# Patient Record
Sex: MALE | Race: BLACK OR AFRICAN AMERICAN | NOT HISPANIC OR LATINO
[De-identification: names, ages, dates, MRNs, and addresses within clinical notes are randomized per-mention and may not be internally consistent; named-entity substitution may affect disease eponyms.]

---

## 2022-12-01 PROBLEM — Z00.00 ENCOUNTER FOR PREVENTIVE HEALTH EXAMINATION: Status: ACTIVE | Noted: 2022-12-01

## 2022-12-06 ENCOUNTER — APPOINTMENT (OUTPATIENT)
Dept: ORTHOPEDIC SURGERY | Facility: CLINIC | Age: 57
End: 2022-12-06
Payer: COMMERCIAL

## 2022-12-06 VITALS
HEIGHT: 69 IN | HEART RATE: 86 BPM | DIASTOLIC BLOOD PRESSURE: 96 MMHG | WEIGHT: 180 LBS | SYSTOLIC BLOOD PRESSURE: 160 MMHG | BODY MASS INDEX: 26.66 KG/M2

## 2022-12-06 PROCEDURE — 99204 OFFICE O/P NEW MOD 45 MIN: CPT | Mod: 25

## 2022-12-06 PROCEDURE — 20550 NJX 1 TENDON SHEATH/LIGAMENT: CPT | Mod: LT

## 2022-12-12 NOTE — HISTORY OF PRESENT ILLNESS
[FreeTextEntry1] : He reports triggering of the left middle finger.  This has been going for weeks. There is stiffness in his hands which is worse in the morning. No other fingers are triggering currently. He denies any locking of the digits. He has had 0 injections in the past for this problem. Medications have not been helpful.  He describes the pain over the A1 pulley. Occasionally it radiates to the dorsal proximal interphalangeal joint. He does not report a history of diabetes.\par

## 2022-12-12 NOTE — PHYSICAL EXAM
[de-identified] : Patient is alert, oriented and in no acute distress. Affect and general appearance are normal and patient is able to answer questions appropriately.  A focused exam of the bilateral upper extremities reveals full motion of the elbow, wrist and fingers. Patient is able to make a tight fist today.  strength is excellent. He has a tender left middle finger A1 pulley with active triggering today. No other fingers have active triggering or A1 pulley tenderness.   Neurologic: Median, ulnar, and radial motor and sensory are intact.  Skin: No cyanosis, clubbing, edema or rashes. Vascular: Radial pulses intact. Lymphatic: No streaking or epitrochlear adenopathy\par

## 2022-12-12 NOTE — ASSESSMENT
[FreeTextEntry1] : 57 year-old male s/p CSI for L middle trigger finger\par \par Plan:\par F/u in  2weeks for interval assessment and potential second injection\par Nighttime extension splinting\par NSAIDs prn

## 2022-12-12 NOTE — PROCEDURE
[] : Trigger Finger: After full discussion of treatment alternatives, and associated risks, complication, limitations, and expectations, and with patient verbal consent following verbal timeout site verification, steroid injections were administered. Following sterile prep with sterile method, 0.5cc Bupivicaine and 6 mg celestone generic were injected into the flexor tendon sheaths of the left [3rd] : 3rd finger

## 2022-12-20 ENCOUNTER — APPOINTMENT (OUTPATIENT)
Dept: ORTHOPEDIC SURGERY | Facility: CLINIC | Age: 57
End: 2022-12-20

## 2022-12-20 VITALS — WEIGHT: 180 LBS | HEIGHT: 69 IN | BODY MASS INDEX: 26.66 KG/M2

## 2022-12-20 PROCEDURE — 99212 OFFICE O/P EST SF 10 MIN: CPT

## 2022-12-20 NOTE — PHYSICAL EXAM
[de-identified] : Patient is alert, oriented and in no acute distress. Affect and general appearance are normal and patient is able to answer questions appropriately.  A focused exam of the bilateral upper extremities reveals full motion of the elbow, wrist and fingers. Patient is able to make a tight fist today.  strength is excellent. He has a nontender left middle finger A1 pulley without active triggering today. No other fingers have active triggering or A1 pulley tenderness.   Neurologic: Median, ulnar, and radial motor and sensory are intact.  Skin: No cyanosis, clubbing, edema or rashes. Vascular: Radial pulses intact. Lymphatic: No streaking or epitrochlear adenopathy\par

## 2022-12-20 NOTE — HISTORY OF PRESENT ILLNESS
[FreeTextEntry1] : He reports resolution of triggering of the middle finger s/p injection.  He is doing very well with no restrictions.

## 2022-12-20 NOTE — ASSESSMENT
[FreeTextEntry1] : 57 year-old male s/p CSI for L middle trigger finger with resolution of symptoms\par \par Plan:\par AAT\par F/u prn (is a candidate for another injection)

## 2023-06-19 ENCOUNTER — INPATIENT (INPATIENT)
Facility: HOSPITAL | Age: 58
LOS: 1 days | Discharge: HOME | End: 2023-06-21
Attending: INTERNAL MEDICINE | Admitting: INTERNAL MEDICINE
Payer: COMMERCIAL

## 2023-06-19 VITALS
SYSTOLIC BLOOD PRESSURE: 150 MMHG | OXYGEN SATURATION: 100 % | DIASTOLIC BLOOD PRESSURE: 100 MMHG | HEART RATE: 102 BPM | RESPIRATION RATE: 18 BRPM | TEMPERATURE: 98 F

## 2023-06-19 DIAGNOSIS — E87.6 HYPOKALEMIA: ICD-10-CM

## 2023-06-19 DIAGNOSIS — Z29.9 ENCOUNTER FOR PROPHYLACTIC MEASURES, UNSPECIFIED: ICD-10-CM

## 2023-06-19 DIAGNOSIS — R20.0 ANESTHESIA OF SKIN: ICD-10-CM

## 2023-06-19 DIAGNOSIS — N18.2 CHRONIC KIDNEY DISEASE, STAGE 2 (MILD): ICD-10-CM

## 2023-06-19 DIAGNOSIS — I10 ESSENTIAL (PRIMARY) HYPERTENSION: ICD-10-CM

## 2023-06-19 DIAGNOSIS — R07.9 CHEST PAIN, UNSPECIFIED: ICD-10-CM

## 2023-06-19 LAB
ALBUMIN SERPL ELPH-MCNC: 4.7 G/DL — SIGNIFICANT CHANGE UP (ref 3.3–5)
ALP SERPL-CCNC: 125 U/L — HIGH (ref 40–120)
ALT FLD-CCNC: 30 U/L — SIGNIFICANT CHANGE UP (ref 4–41)
ANION GAP SERPL CALC-SCNC: 11 MMOL/L — SIGNIFICANT CHANGE UP (ref 7–14)
APTT BLD: 31 SEC — SIGNIFICANT CHANGE UP (ref 27–36.3)
AST SERPL-CCNC: 22 U/L — SIGNIFICANT CHANGE UP (ref 4–40)
BASOPHILS # BLD AUTO: 0.06 K/UL — SIGNIFICANT CHANGE UP (ref 0–0.2)
BASOPHILS NFR BLD AUTO: 0.7 % — SIGNIFICANT CHANGE UP (ref 0–2)
BILIRUB SERPL-MCNC: <0.2 MG/DL — SIGNIFICANT CHANGE UP (ref 0.2–1.2)
BUN SERPL-MCNC: 17 MG/DL — SIGNIFICANT CHANGE UP (ref 7–23)
CALCIUM SERPL-MCNC: 9.8 MG/DL — SIGNIFICANT CHANGE UP (ref 8.4–10.5)
CHLORIDE SERPL-SCNC: 105 MMOL/L — SIGNIFICANT CHANGE UP (ref 98–107)
CO2 SERPL-SCNC: 24 MMOL/L — SIGNIFICANT CHANGE UP (ref 22–31)
CREAT SERPL-MCNC: 1.35 MG/DL — HIGH (ref 0.5–1.3)
EGFR: 61 ML/MIN/1.73M2 — SIGNIFICANT CHANGE UP
EOSINOPHIL # BLD AUTO: 0.08 K/UL — SIGNIFICANT CHANGE UP (ref 0–0.5)
EOSINOPHIL NFR BLD AUTO: 0.9 % — SIGNIFICANT CHANGE UP (ref 0–6)
GLUCOSE SERPL-MCNC: 129 MG/DL — HIGH (ref 70–99)
HCT VFR BLD CALC: 43.8 % — SIGNIFICANT CHANGE UP (ref 39–50)
HGB BLD-MCNC: 14.5 G/DL — SIGNIFICANT CHANGE UP (ref 13–17)
IANC: 4.4 K/UL — SIGNIFICANT CHANGE UP (ref 1.8–7.4)
IMM GRANULOCYTES NFR BLD AUTO: 0.2 % — SIGNIFICANT CHANGE UP (ref 0–0.9)
INR BLD: 1.08 RATIO — SIGNIFICANT CHANGE UP (ref 0.88–1.16)
LYMPHOCYTES # BLD AUTO: 3.35 K/UL — HIGH (ref 1–3.3)
LYMPHOCYTES # BLD AUTO: 38.2 % — SIGNIFICANT CHANGE UP (ref 13–44)
MCHC RBC-ENTMCNC: 30.3 PG — SIGNIFICANT CHANGE UP (ref 27–34)
MCHC RBC-ENTMCNC: 33.1 GM/DL — SIGNIFICANT CHANGE UP (ref 32–36)
MCV RBC AUTO: 91.4 FL — SIGNIFICANT CHANGE UP (ref 80–100)
MONOCYTES # BLD AUTO: 0.85 K/UL — SIGNIFICANT CHANGE UP (ref 0–0.9)
MONOCYTES NFR BLD AUTO: 9.7 % — SIGNIFICANT CHANGE UP (ref 2–14)
NEUTROPHILS # BLD AUTO: 4.4 K/UL — SIGNIFICANT CHANGE UP (ref 1.8–7.4)
NEUTROPHILS NFR BLD AUTO: 50.3 % — SIGNIFICANT CHANGE UP (ref 43–77)
NRBC # BLD: 0 /100 WBCS — SIGNIFICANT CHANGE UP (ref 0–0)
NRBC # FLD: 0 K/UL — SIGNIFICANT CHANGE UP (ref 0–0)
PLATELET # BLD AUTO: 231 K/UL — SIGNIFICANT CHANGE UP (ref 150–400)
POTASSIUM SERPL-MCNC: 3.3 MMOL/L — LOW (ref 3.5–5.3)
POTASSIUM SERPL-SCNC: 3.3 MMOL/L — LOW (ref 3.5–5.3)
PROT SERPL-MCNC: 8.2 G/DL — SIGNIFICANT CHANGE UP (ref 6–8.3)
PROTHROM AB SERPL-ACNC: 12.5 SEC — SIGNIFICANT CHANGE UP (ref 10.5–13.4)
RBC # BLD: 4.79 M/UL — SIGNIFICANT CHANGE UP (ref 4.2–5.8)
RBC # FLD: 12.7 % — SIGNIFICANT CHANGE UP (ref 10.3–14.5)
SODIUM SERPL-SCNC: 140 MMOL/L — SIGNIFICANT CHANGE UP (ref 135–145)
TROPONIN T, HIGH SENSITIVITY RESULT: 10 NG/L — SIGNIFICANT CHANGE UP
TROPONIN T, HIGH SENSITIVITY RESULT: 11 NG/L — SIGNIFICANT CHANGE UP
WBC # BLD: 8.76 K/UL — SIGNIFICANT CHANGE UP (ref 3.8–10.5)
WBC # FLD AUTO: 8.76 K/UL — SIGNIFICANT CHANGE UP (ref 3.8–10.5)

## 2023-06-19 PROCEDURE — 70496 CT ANGIOGRAPHY HEAD: CPT | Mod: 26,MA

## 2023-06-19 PROCEDURE — 99223 1ST HOSP IP/OBS HIGH 75: CPT

## 2023-06-19 PROCEDURE — 99254 IP/OBS CNSLTJ NEW/EST MOD 60: CPT

## 2023-06-19 PROCEDURE — 70498 CT ANGIOGRAPHY NECK: CPT | Mod: 26,MA

## 2023-06-19 PROCEDURE — 0042T: CPT | Mod: MA

## 2023-06-19 PROCEDURE — 99291 CRITICAL CARE FIRST HOUR: CPT

## 2023-06-19 PROCEDURE — 93306 TTE W/DOPPLER COMPLETE: CPT | Mod: 26

## 2023-06-19 RX ORDER — ATORVASTATIN CALCIUM 80 MG/1
80 TABLET, FILM COATED ORAL AT BEDTIME
Refills: 0 | Status: DISCONTINUED | OUTPATIENT
Start: 2023-06-19 | End: 2023-06-21

## 2023-06-19 RX ORDER — AMLODIPINE BESYLATE 2.5 MG/1
1 TABLET ORAL
Refills: 0 | DISCHARGE

## 2023-06-19 RX ORDER — PREGABALIN 225 MG/1
1000 CAPSULE ORAL DAILY
Refills: 0 | Status: DISCONTINUED | OUTPATIENT
Start: 2023-06-19 | End: 2023-06-21

## 2023-06-19 RX ORDER — ASPIRIN/CALCIUM CARB/MAGNESIUM 324 MG
81 TABLET ORAL DAILY
Refills: 0 | Status: DISCONTINUED | OUTPATIENT
Start: 2023-06-19 | End: 2023-06-21

## 2023-06-19 RX ORDER — CHOLECALCIFEROL (VITAMIN D3) 125 MCG
1000 CAPSULE ORAL DAILY
Refills: 0 | Status: DISCONTINUED | OUTPATIENT
Start: 2023-06-19 | End: 2023-06-21

## 2023-06-19 NOTE — SWALLOW BEDSIDE ASSESSMENT ADULT - ASR SWALLOW RECOMMEND DIAG
Objective testing is NOT indicated given functional swallow for regular solids and thin liquids with no overt s/s of penetration/aspiration evidenced

## 2023-06-19 NOTE — ED ADULT NURSE REASSESSMENT NOTE - NS ED NURSE REASSESS COMMENT FT1
Pt was received from handoff. Pt is AxO 3. Pt continues to have some left sided numbness in his left foot, and face. BEFAST negative, with equal sensation bilaterally. Respirations are even and unlabored. Pt denies headaches, dizziness, N/V/D, chest pain, SOB, or fever like symptoms. Pt is awaiting for MRI. Will continue to monitor,  and maintain safety.

## 2023-06-19 NOTE — PHYSICAL THERAPY INITIAL EVALUATION ADULT - PERTINENT HX OF CURRENT PROBLEM, REHAB EVAL
57-year-old male with HTN, presenting from home with 5 days of intermittent chest pain and 1 night of Left facial numbness and Left foot paresthesias, admitted for further work up

## 2023-06-19 NOTE — H&P ADULT - NSHPPHYSICALEXAM_GEN_ALL_CORE
Vital Signs Last 24 Hrs  T(C): 36.7 (19 Jun 2023 01:56), Max: 36.7 (19 Jun 2023 01:56)  T(F): 98.1 (19 Jun 2023 01:56), Max: 98.1 (19 Jun 2023 01:56)  HR: 102 (19 Jun 2023 01:56) (102 - 102)  BP: 150/100 (19 Jun 2023 01:56) (150/100 - 150/100)  RR: 18 (19 Jun 2023 01:56) (18 - 18)  SpO2: 100% (19 Jun 2023 01:56) (100% - 100%)    Parameters below as of 19 Jun 2023 01:56  Patient On (Oxygen Delivery Method): room air    PHYSICAL EXAM:  GENERAL: NAD, well-developed, well-nourished  HEAD:  Atraumatic, Normocephalic  EYES: EOMI, PERRL, conjunctiva and sclera clear  NECK: Supple, No JVD  CHEST/LUNG: Clear to auscultation bilaterally; No wheezes, rales or rhonchi; normal work of breathing, speaking in full sentences  HEART: Regular rate and rhythm; No murmurs, rubs, or gallops, (+)S1, S2  ABDOMEN: Soft, Nontender, Nondistended; Normal Bowel sounds   EXTREMITIES:  2+ Peripheral Pulses, No clubbing, cyanosis, or edema  PSYCH: normal mood and affect, A&Ox3  NEUROLOGY: no focal neuro deficits, slight tremor L hand (chronic per patient), CN II-XII intact, FTN intact b/l, strength 5/5 x4 extremities, sensation grossly intact  SKIN: No rashes or lesions on limited exam

## 2023-06-19 NOTE — ED PROVIDER NOTE - PHYSICAL EXAMINATION
Well-appearing no acute distress speaking full sentences.  Right facial droop with minimal labial fold activation compared to left.  Heart is regular rate and rhythm lungs are clear to auscultation abdomen soft nontender finger-to-nose with some tremors on left right is normal.   strength is 5 out of 5 bilateral upper extremities.  Sensation intact to light touch on bilateral face and lower legs and upper extremities.  No pitting edema.  Gait is normal

## 2023-06-19 NOTE — CONSULT NOTE ADULT - ASSESSMENT
57-year-old male with HTN, presenting from home with 5 days of intermittent chest pain and 1 night of L facial numbness and L foot paresthesias, admitted for further w/u. I have reviewed and confirmed nurses' notes...

## 2023-06-19 NOTE — H&P ADULT - PROBLEM SELECTOR PLAN 1
improving   appreciate neuro recs  check MRI brain w/o   TTE with bubble study   start on Aspirin 81MG PO daily pending dysphagia screen, discontinue if MRI negative   start Atorvastatin 80MG QHS, titrate to LDL<70 pending dysphagia screen  monitor on tele  neuro checks and VS Q4H  Permissive HTN up to 220/120 mmHg for first 24 hours after symptom onset followed by gradual normotension.   NPO pending dysphagia screen  Fall, aspiration precautions  PT/OT if MRI (+) for CVA improving   appreciate neuro recs  check MRI brain w/o   TTE with bubble study   start on Aspirin 81MG PO daily pending dysphagia screen, discontinue if MRI negative   start Atorvastatin 80MG QHS, titrate to LDL<70 pending dysphagia screen  monitor on tele  neuro checks and VS Q4H  Permissive HTN up to 220/120 mmHg for first 24 hours after symptom onset followed by gradual normotension.   NPO pending dysphagia screen  Fall, aspiration precautions  PT/OT if MRI (+) for CVA  recent B12 204, will add on repeat to re-check and start on B12 supplementation

## 2023-06-19 NOTE — CONSULT NOTE ADULT - SUBJECTIVE AND OBJECTIVE BOX
CHIEF COMPLAINT:    HISTORY OF PRESENT ILLNESS:    PAST MEDICAL & SURGICAL HISTORY:  HTN (hypertension)      Gastritis      No significant past surgical history              MEDICATIONS:                  FAMILY HISTORY:  No pertinent family history in first degree relatives        SOCIAL HISTORY:    [ ] Non-smoker  [ ] Smoker  [ ] Alcohol    Allergies    No Known Allergies    Intolerances    	    REVIEW OF SYSTEMS:  CONSTITUTIONAL: No fever, weight loss, or fatigue  EYES: No eye pain, visual disturbances, or discharge  ENMT:  No difficulty hearing, tinnitus, vertigo; No sinus or throat pain  NECK: No pain or stiffness  RESPIRATORY: No cough, wheezing, chills or hemoptysis; No Shortness of Breath  CARDIOVASCULAR: No chest pain, palpitations, passing out, dizziness, or leg swelling  GASTROINTESTINAL: No abdominal or epigastric pain. No nausea, vomiting, or hematemesis; No diarrhea or constipation. No melena or hematochezia.  GENITOURINARY: No dysuria, frequency, hematuria, or incontinence  NEUROLOGICAL: No headaches, memory loss, loss of strength, numbness, or tremors  SKIN: No itching, burning, rashes, or lesions   LYMPH Nodes: No enlarged glands  ENDOCRINE: No heat or cold intolerance; No hair loss  MUSCULOSKELETAL: No joint pain or swelling; No muscle, back, or extremity pain  PSYCHIATRIC: No depression, anxiety, mood swings, or difficulty sleeping  HEME/LYMPH: No easy bruising, or bleeding gums  ALLERY AND IMMUNOLOGIC: No hives or eczema	    [ ] All others negative	  [ ] Unable to obtain    PHYSICAL EXAM:  T(C): 36.4 (06-19-23 @ 08:52), Max: 36.8 (06-19-23 @ 06:20)  HR: 84 (06-19-23 @ 08:52) (84 - 102)  BP: 149/92 (06-19-23 @ 08:52) (145/89 - 150/100)  RR: 18 (06-19-23 @ 08:52) (18 - 18)  SpO2: 100% (06-19-23 @ 08:52) (100% - 100%)  Wt(kg): --  I&O's Summary      Appearance: Normal	  HEENT:   Normal oral mucosa, PERRL, EOMI	  Lymphatic: No lymphadenopathy  Cardiovascular: Normal S1 S2, No JVD, No murmurs, No edema  Respiratory: Lungs clear to auscultation	  Psychiatry: A & O x 3, Mood & affect appropriate  Gastrointestinal:  Soft, Non-tender, + BS	  Skin: No rashes, No ecchymoses, No cyanosis	  Neurologic: Non-focal  Extremities: Normal range of motion, No clubbing, cyanosis or edema  Vascular: Peripheral pulses palpable 2+ bilaterally    TELEMETRY: 	    ECG:  	  RADIOLOGY:  OTHER: 	  	  LABS:	 	    CARDIAC MARKERS:      Troponin T, High Sensitivity Result: 11: Rapid changes upward or downward in high-sensitivity troponin levels Troponin T, High Sensitivity Result: 10: Rapid changes upward or downward in high-sensitivity troponin levels                             14.5   8.76  )-----------( 231      ( 19 Jun 2023 02:50 )             43.8     06-19    140  |  105  |  17  ----------------------------<  129<H>  3.3<L>   |  24  |  1.35<H>    Ca    9.8      19 Jun 2023 02:50    TPro  8.2  /  Alb  4.7  /  TBili  <0.2  /  DBili  x   /  AST  22  /  ALT  30  /  AlkPhos  125<H>  06-19    proBNP:   Lipid Profile:   HgA1c:   TSH:            CHIEF COMPLAINT:    HISTORY OF PRESENT ILLNESS:  57-year-old male with HTN, presenting from home with 5 days of intermittent chest pain described as a pressure, non-radiating, without associated shortness of breath/diaphoresis/nausea/vomiting, worse when laying supine, non-pleuritic in nature. Patient reports having a stress test and echo last week due to elevated BP, does not yet know results. He notes that the chest pain started after the stress testing. He notes that last night experienced acute onset of L facial numbness and L foot paresthesias. He denies any facial drooping, slurring of speech, weakness. He notes the paresthesias have resolved and the facial numbness has improved. He has no known history of heart disease or stroke. He reports intermittent non-adherence with medication regimen however has been taking it as prescribed for the past 5 days.    In the ED VS:  98.1  102  150/100  18  100%RA      PAST MEDICAL & SURGICAL HISTORY:  HTN (hypertension)      Gastritis      No significant past surgical history              MEDICATIONS:                  FAMILY HISTORY:  No pertinent family history in first degree relatives        SOCIAL HISTORY:    [ ] Non-smoker  [ ] Smoker  [ ] Alcohol    Allergies    No Known Allergies    Intolerances    	    REVIEW OF SYSTEMS:  CONSTITUTIONAL: No fever, weight loss, or fatigue  EYES: No eye pain, visual disturbances, or discharge  ENMT:  No difficulty hearing, tinnitus, vertigo; No sinus or throat pain  NECK: No pain or stiffness  RESPIRATORY: No cough, wheezing, chills or hemoptysis; No Shortness of Breath  CARDIOVASCULAR: No chest pain, palpitations, passing out, dizziness, or leg swelling  GASTROINTESTINAL: No abdominal or epigastric pain. No nausea, vomiting, or hematemesis; No diarrhea or constipation. No melena or hematochezia.  GENITOURINARY: No dysuria, frequency, hematuria, or incontinence  NEUROLOGICAL: No headaches, memory loss, loss of strength, numbness, or tremors  SKIN: No itching, burning, rashes, or lesions   LYMPH Nodes: No enlarged glands  ENDOCRINE: No heat or cold intolerance; No hair loss  MUSCULOSKELETAL: No joint pain or swelling; No muscle, back, or extremity pain  PSYCHIATRIC: No depression, anxiety, mood swings, or difficulty sleeping  HEME/LYMPH: No easy bruising, or bleeding gums  ALLERY AND IMMUNOLOGIC: No hives or eczema	    [ ] All others negative	  [ ] Unable to obtain    PHYSICAL EXAM:  T(C): 36.4 (06-19-23 @ 08:52), Max: 36.8 (06-19-23 @ 06:20)  HR: 84 (06-19-23 @ 08:52) (84 - 102)  BP: 149/92 (06-19-23 @ 08:52) (145/89 - 150/100)  RR: 18 (06-19-23 @ 08:52) (18 - 18)  SpO2: 100% (06-19-23 @ 08:52) (100% - 100%)  Wt(kg): --  I&O's Summary      Appearance: Normal	  HEENT:   Normal oral mucosa, PERRL, EOMI	  Lymphatic: No lymphadenopathy  Cardiovascular: Normal S1 S2, No JVD, No murmurs, No edema  Respiratory: Lungs clear to auscultation	  Psychiatry: A & O x 3, Mood & affect appropriate  Gastrointestinal:  Soft, Non-tender, + BS	  Skin: No rashes, No ecchymoses, No cyanosis	  Neurologic: Non-focal  Extremities: Normal range of motion, No clubbing, cyanosis or edema  Vascular: Peripheral pulses palpable 2+ bilaterally    TELEMETRY: 	    ECG:  	  RADIOLOGY:  < from: CT Brain Perfusion Maps Stroke (06.19.23 @ 03:17) >    ACC: [id number] EXAM:  CT ANGIO NECK STROKE PROTCL IC   ORDERED BY: MARVIN GUZMAN     ACC: [id number] EXAM:  CT BRAIN PERFUSION MAPS STROKE   ORDERED BY: MARVIN GUZMAN     ACC: [id number] EXAM:  CT ANGIO BRAIN STROKE PROTC IC   ORDERED BY: MARVIN GUZMAN     PROCEDURE DATE:  06/19/2023          INTERPRETATION:  CLINICAL HISTORY: Code stroke. Left facial numbness.    TECHNIQUE:  During IV contrast administration, serial thin section CT images of the   brain were obtained for CT perfusion. The raw data was sent to [facility]   [facility] for post processing.  Axial CT images were then acquired through the  neck and head  during the   arterial phase.  Three-dimensional MIP reformats were generated.    CONTRAST/COMPLICATIONS:  IV Contrast: IV contrast documented in unlinked concurrent exam   (accession [id number]), Omnipaque 350 (accession [id number]), IV contrast   documented in unlinked concurrent exam (accession [id number])  95 cc   administered   5 cc discarded  Complications: None reported at time of study completion    COMPARISON STUDY: None.    FINDINGS:    CT PERFUSION:    Perfusion parameters are as follows:    CBF <30%: 0 mL  Tmax >6 seconds: 0 mL  Mismatch volume: 0 mL  Mismatch ratio: None.    CT ANGIOGRAPHY NECK:    Thoracic aorta and branch vessels: Patent.  No atherosclerosis.  No   flow-limiting stenosis.  No evidence of dissection.    Right carotid system: Patent.  No atherosclerosis.  No hemodynamically   significant stenosis using NASCET criteria.  No evidence of dissection.    Left carotid system: Patent.  No atherosclerosis.  No hemodynamically   significant stenosis using NASCET criteria.  No evidence of dissection.    Vertebral arteries: Patent.  No atherosclerosis.  No flow-limiting   stenosis.  No evidence of dissection.    Soft tissues of the neck: Unremarkable.    Visualized spine: Multilevel degenerative change.    Visualized upper chest: Unremarkable.    CT ANGIOGRAPHY BRAIN:    Internal carotid arteries: Patent bilaterally. No discrete calcifications   No flow limiting stenosis.    Anterior cerebral arteries: Patent bilaterally without flow limiting   stenosis.    Middle cerebral arteries: Patent bilaterally without flow limiting   stenosis.    Anterior communicating artery: Visualized.    Posterior communicating arteries: Visualized bilaterally.    Posterior cerebral arteries: Patent bilaterally without stenosis.    Vertebrobasilar: Patent without stenosis. The distal vertebral arteries   are similar in caliber.  Bilateral posterior inferior cerebellar   arteries, bilateral anterior inferior cerebellar arteries and bilateral   superior cerebellar arteries are visualized.    Vascular lesions: No evidence of intracranial aneurysm within limits of   CTA technique.  Tiny aneurysms may be beyond the resolution of this   examination.    Dural venous sinuses: Grossly patent.    IMPRESSION:    CTA Neck: No significant flow-limiting stenosis or evidence of acute   dissection within the cervical carotid or vertebral arteries.    CTA Head: No proximal large vessel occlusion or significant stenosis.    CT Perfusion: No perfusion abnormality.    --- End of Report ---            FRANKY GALLARDO MD; Attending Radiologist  This document has been electronically signed. Jun 19 2023  3:21AM    < end of copied text >  Troponin T, High Sensitivity Result: 11: Rapid changes upward or downward in high-sensitivity troponin levels Troponin T, High Sensitivity Result: 10: Rapid changes upward or downward in high-sensitivity troponin levels   OTHER: 	  	  LABS:	 	    CARDIAC MARKERS:      Troponin T, High Sensitivity Result: 11: Rapid changes upward or downward in high-sensitivity troponin levels Troponin T, High Sensitivity Result: 10: Rapid changes upward or downward in high-sensitivity troponin levels                             14.5   8.76  )-----------( 231      ( 19 Jun 2023 02:50 )             43.8     06-19    140  |  105  |  17  ----------------------------<  129<H>  3.3<L>   |  24  |  1.35<H>    Ca    9.8      19 Jun 2023 02:50    TPro  8.2  /  Alb  4.7  /  TBili  <0.2  /  DBili  x   /  AST  22  /  ALT  30  /  AlkPhos  125<H>  06-19    proBNP:   Lipid Profile:   HgA1c:   TSH:

## 2023-06-19 NOTE — CONSULT NOTE ADULT - SUBJECTIVE AND OBJECTIVE BOX
Neurology - Consult Note    -  Spectra: [phone] ([facility]), [phone] ([facility])  -    HPI: Patient DAVE LEYVA is a 57y (1965) man with HTN presenting as code stroke for left facial numbness for 3 days. Patient also endorses left foot tingling sensation as well. Denies weakness, changes to speech    NIHSS 0  mRS 0  LKN 6/16 unknown time      Review of Systems:   All other review of systems is negative unless indicated above.    Allergies:  No Known Allergies      PMHx/PSHx/Family Hx: As above, otherwise see below   HTN (hypertension)        Social Hx:  No current use of tobacco, alcohol, or illicit drugs      Medications:  MEDICATIONS  (STANDING):    MEDICATIONS  (PRN):      Vitals:  T(C): 36.7 (06-19-23 @ 01:56), Max: 36.7 (06-19-23 @ 01:56)  HR: 102 (06-19-23 @ 01:56) (102 - 102)  BP: 150/100 (06-19-23 @ 01:56) (150/100 - 150/100)  RR: 18 (06-19-23 @ 01:56) (18 - 18)  SpO2: 100% (06-19-23 @ 01:56) (100% - 100%)    Physical Examination: INCOMPLETE  General - NAD    Neurologic Exam:  Mental status - Awake, Alert, Oriented to person, place, and time. Speech fluent, repetition and naming intact. Follows simple and complex commands    Cranial nerves - PERRL, VFF, EOMI, face sensation (V1-V3) intact b/l, facial strength intact without asymmetry b/l, hearing intact b/l, palate with symmetric elevation, trapezius 5/5 strength b/l, tongue midline on protrusion with full lateral movement    Motor - Normal bulk and tone throughout. No pronator drift.  Strength testing            Deltoid      Biceps      Triceps     Wrist Extension    Wrist Flexion                               R            5                 5               5                     5                              5                        5                   L             5                 5               5                     5                              5                        5                              Hip Flexion    Hip Extension    Knee Flexion    Knee Extension    Dorsiflexion    Plantar Flexion  R              5                           5                       5                           5                            5                          5  L              5                           5                        5                           5                            5                          5    Sensation - Light touch/temperature/pain intact throughout    DTR's -             Biceps      Triceps     Brachioradialis      Patellar    Ankle    Toes/plantar response  R             2+             2+                  2+                       2+            2+                 Down  L              2+             2+                 2+                        2+           2+                 Down    Coordination - Finger to Nose intact b/l. No tremors appreciated    Gait and station - Unable to assess     Labs:          CAPILLARY BLOOD GLUCOSE  119 (19 Jun 2023 02:58)      POCT Blood Glucose.: 119 mg/dL (19 Jun 2023 02:47)        Radiology:     Neurology - Consult Note    -  Spectra: [phone] ([facility]), [phone] ([facility])  -    HPI: Patient DAVE LEYVA is a RH 57y (1965) man with HTN presenting as code stroke for left facial numbness for 3 days. He started to take aspirin when the numbness started. Patient also endorses left foot tingling sensation as well that started at 11 pm 6/18. He endorses previous episode of left foot tingling, however never experienced facial numbness before. He also endorses chest pain. Denies weakness, changes to speech, difficulty walking, dizziness, headache, changes to vision.     NIHSS 0  mRS 0  LKN 6/16 unknown time      Review of Systems:   All other review of systems is negative unless indicated above.    Allergies:  No Known Allergies      PMHx/PSHx/Family Hx: As above, otherwise see below   HTN (hypertension)        Social Hx:  No current use of tobacco, alcohol, or illicit drugs      Medications:  MEDICATIONS  (STANDING):    MEDICATIONS  (PRN):      Vitals:  T(C): 36.7 (06-19-23 @ 01:56), Max: 36.7 (06-19-23 @ 01:56)  HR: 102 (06-19-23 @ 01:56) (102 - 102)  BP: 150/100 (06-19-23 @ 01:56) (150/100 - 150/100)  RR: 18 (06-19-23 @ 01:56) (18 - 18)  SpO2: 100% (06-19-23 @ 01:56) (100% - 100%)    Physical Examination:  General - NAD    Neurologic Exam:  Mental status - Awake, Alert, Oriented to person, place, and time. Speech fluent, repetition and naming intact. Follows simple and complex commands    Cranial nerves - PERRL, VFF, EOMI, face sensation (V1-V3) intact b/l, facial strength intact without asymmetry b/l, hearing intact b/l, palate with symmetric elevation, trapezius 5/5 strength b/l, tongue midline on protrusion with full lateral movement    Motor - Normal bulk and tone throughout. No pronator drift.  Strength testing            Deltoid      Biceps      Triceps     Wrist Extension    Wrist Flexion                               R            5                 5               5                     5                              5                        5                   L             5                 5               5                     5                              5                        5                              Hip Flexion    Hip Extension    Knee Flexion    Knee Extension    Dorsiflexion    Plantar Flexion  R              5                           5                       5                           5                            5                          5  L              5                           5                        5                           5                            5                          5    Sensation - Light touch/temperature/pain intact throughout    DTR's -             Biceps      Triceps     Brachioradialis      Patellar    Ankle    Toes/plantar response  R             2+             2+                  2+                       2+            2+                 Down  L              2+             2+                 2+                        2+           2+                 Down    Coordination - Finger to Nose intact b/l. No tremors appreciated    Gait and station - Unable to assess     Labs:          CAPILLARY BLOOD GLUCOSE  119 (19 Jun 2023 02:58)      POCT Blood Glucose.: 119 mg/dL (19 Jun 2023 02:47)        Radiology:  < from: CT Brain Perfusion Maps Stroke (06.19.23 @ 03:17) >  IMPRESSION:    CTA Neck: No significant flow-limiting stenosis or evidence of acute   dissection within the cervical carotid or vertebral arteries.    CTA Head: No proximal large vessel occlusion or significant stenosis.    CT Perfusion: No perfusion abnormality.    < from: CT Brain Stroke Protocol (06.19.23 @ 03:14) >  IMPRESSION:    No CT evidence of acute intracranial hemorrhage, mass effect, or evidence   of acute vascular territorial infarction.          Neurology - Consult Note    -  Spectra: [phone] ([facility]), [phone] ([facility])  -    HPI: Patient DAVE LEYVA is a RH 57y (1965) man with HTN presenting as code stroke for left facial numbness for 3 days. He started to take aspirin when the numbness started. Numbness fluctuates in intensity over the last 3 days. Patient also endorses left foot tingling sensation as well that started at 11 pm 6/18. He endorses previous episode of left foot tingling, however never experienced facial numbness before. He also endorses chest pain. Denies weakness, changes to speech, difficulty walking, dizziness, headache, changes to vision.     NIHSS 0  mRS 0  LKN 6/16 unknown time      Review of Systems:   All other review of systems is negative unless indicated above.    Allergies:  No Known Allergies      PMHx/PSHx/Family Hx: As above, otherwise see below   HTN (hypertension)        Social Hx:  No current use of tobacco, alcohol, or illicit drugs      Medications:  MEDICATIONS  (STANDING):    MEDICATIONS  (PRN):      Vitals:  T(C): 36.7 (06-19-23 @ 01:56), Max: 36.7 (06-19-23 @ 01:56)  HR: 102 (06-19-23 @ 01:56) (102 - 102)  BP: 150/100 (06-19-23 @ 01:56) (150/100 - 150/100)  RR: 18 (06-19-23 @ 01:56) (18 - 18)  SpO2: 100% (06-19-23 @ 01:56) (100% - 100%)    Physical Examination:  General - NAD    Neurologic Exam:  Mental status - Awake, Alert, Oriented to person, place, and time. Speech fluent, repetition and naming intact. Follows simple and complex commands    Cranial nerves - PERRL, VFF, EOMI, face sensation (V1-V3) intact b/l, facial strength intact without asymmetry b/l, hearing intact b/l, palate with symmetric elevation, trapezius 5/5 strength b/l, tongue midline on protrusion with full lateral movement    Motor - Normal bulk and tone throughout. No pronator drift.  Strength testing            Deltoid      Biceps      Triceps     Wrist Extension    Wrist Flexion                               R            5                 5               5                     5                              5                        5                   L             5                 5               5                     5                              5                        5                              Hip Flexion    Hip Extension    Knee Flexion    Knee Extension    Dorsiflexion    Plantar Flexion  R              5                           5                       5                           5                            5                          5  L              5                           5                        5                           5                            5                          5    Sensation - Light touch/temperature/pain intact throughout    DTR's -             Biceps      Triceps     Brachioradialis      Patellar    Ankle    Toes/plantar response  R             2+             2+                  2+                       2+            2+                 Down  L              2+             2+                 2+                        2+           2+                 Down    Coordination - Finger to Nose intact b/l. No tremors appreciated    Gait and station - Unable to assess     Labs:          CAPILLARY BLOOD GLUCOSE  119 (19 Jun 2023 02:58)      POCT Blood Glucose.: 119 mg/dL (19 Jun 2023 02:47)        Radiology:  < from: CT Brain Perfusion Maps Stroke (06.19.23 @ 03:17) >  IMPRESSION:    CTA Neck: No significant flow-limiting stenosis or evidence of acute   dissection within the cervical carotid or vertebral arteries.    CTA Head: No proximal large vessel occlusion or significant stenosis.    CT Perfusion: No perfusion abnormality.    < from: CT Brain Stroke Protocol (06.19.23 @ 03:14) >  IMPRESSION:    No CT evidence of acute intracranial hemorrhage, mass effect, or evidence   of acute vascular territorial infarction.

## 2023-06-19 NOTE — ED ADULT NURSE REASSESSMENT NOTE - NS ED NURSE REASSESS COMMENT FT1
Pt remains resting in bed. Pt denies headaches, dizziness, N/V/D, chest pain, or SOB. Respirations are even and unlabored. Will; continue to monitor.

## 2023-06-19 NOTE — H&P ADULT - PROBLEM SELECTOR PLAN 3
not experiencing at present  would call PMD as patient unsure of cardiologist, who recently performed stress test and obtain results   flat delta trop, check 3rd set  monitor on tele

## 2023-06-19 NOTE — SWALLOW BEDSIDE ASSESSMENT ADULT - COMMENTS
As per H&P dated 6/19/23, "57-year-old male with HTN, presenting from home with 5 days of intermittent chest pain and 1 night of L facial numbness and L foot paresthesias, admitted for further w/u."    As per Neurology note dated 6/19/23 "Impression: Left facial numbness possibly due toxic, metabolic infectious etiology vs acute ischemic stroke"    No available chest imaging.     Patient visited at bedside for clinical swallow evaluation. Patient presents as awake and alert, able to follow 1-step directions and make basic wants/needs known. Patient denies difficulty swallowing.

## 2023-06-19 NOTE — PHYSICAL THERAPY INITIAL EVALUATION ADULT - GROSSLY INTACT, SENSORY
Medication Name: Adderall XR    No prior authorization is needed, Medication Prior Authorization team will close this encounter    Prescription should of been for IR and it is approved   Grossly Intact

## 2023-06-19 NOTE — H&P ADULT - NSHPSOCIALHISTORY_GEN_ALL_CORE
Lives with ant and 2 children  Rare alcohol use  Denies tobacco or illicit drug use  Not presently working, previously worked as an

## 2023-06-19 NOTE — H&P ADULT - NSHPREVIEWOFSYSTEMS_GEN_ALL_CORE
REVIEW OF SYSTEMS:    CONSTITUTIONAL: No weakness, fevers or chills  EYES/ENT: No visual changes; No dysphagia; No sore throat; No rhinorrhea; No sinus pain/pressure  NECK: No pain or stiffness  RESPIRATORY: No cough, wheezing, hemoptysis; No shortness of breath  CARDIOVASCULAR: (+) intermittent chest pain; No palpitations; No lower extremity edema  GASTROINTESTINAL: No abdominal or epigastric pain. No nausea, vomiting, or hematemesis; No diarrhea or constipation. No melena or hematochezia.  GENITOURINARY: No dysuria, frequency or hematuria  NEUROLOGICAL: (+) numbness, paresthesias; No weakness; No HA; No LH/dizziness  MSK: ambulates without aid; No falls  SKIN: No itching, burning, rashes, or lesions   All other review of systems is negative unless indicated above. REVIEW OF SYSTEMS:    CONSTITUTIONAL: No weakness, fevers or chills  EYES/ENT: No visual changes; No dysphagia; No sore throat; No rhinorrhea; No sinus pain/pressure  NECK: No pain or stiffness  RESPIRATORY: No cough, wheezing, hemoptysis; No shortness of breath  CARDIOVASCULAR: (+) intermittent chest pain; No palpitations; No lower extremity edema  GASTROINTESTINAL: No abdominal or epigastric pain. No nausea, vomiting, or hematemesis; No diarrhea or constipation. No melena or hematochezia. (+) intermittent GERD   GENITOURINARY: No dysuria, frequency or hematuria  NEUROLOGICAL: (+) numbness, paresthesias; No weakness; No HA; No LH/dizziness  MSK: ambulates without aid; No falls  SKIN: No itching, burning, rashes, or lesions   All other review of systems is negative unless indicated above.

## 2023-06-19 NOTE — H&P ADULT - HISTORY OF PRESENT ILLNESS
57-year-old male with HTN, presenting from home with 5 days of intermittent chest pain described as a pressure, non-radiating, without associated shortness of breath/diaphoresis/nausea/vomiting, worse when laying supine, non-pleuritic in nature. Patient reports having a stress test and echo last week due to elevated BP, does not yet know results. He notes that the chest pain started after the stress testing. He notes that last night experienced acute onset of L facial numbness and L foot paresthesias. He denies any facial drooping, slurring of speech, weakness. He notes the paresthesias have resolved and the facial numbness has improved. He has no known history of heart disease or stroke. He reports intermittent non-adherence with medication regimen however has been taking it as prescribed for the past 5 days.    In the ED VS:  98.1  102  150/100  18  100%RA

## 2023-06-19 NOTE — SWALLOW BEDSIDE ASSESSMENT ADULT - SWALLOW EVAL: DIAGNOSIS
1. Functional oral phase for puree, regular solids, mildly-thick and thin liquids marked by adequate acceptance and containment, adequate mastication of solids, adequate oral transit and adequate oral clearance. 2. Functional pharyngeal phase for aforementioned consistencies marked by hyolaryngeal excursion present upon palpation and no overt s/s of penetration/aspiration evidenced.

## 2023-06-19 NOTE — ED PROVIDER NOTE - OBJECTIVE STATEMENT
57-year-old male with history of hypertension on amlodipine that takes it "as needed" presents with multiple medical complaints including chest pain but also left facial numbness and left foot tingling.  Chest pain started 5 days ago pinching pain sharp in quality with pressure-like quality as well nonradiating started 5 days ago not worse with exertion no associated shortness of breath no associated diaphoresis or nausea or vomiting.  Also tonight started having some numbness to left side of face and then left foot felt like it was tingling.  Is still able to ambulate normally.  Was brought in by daughter for concerns of stroke.  Patient denies any recent fevers, chills, nausea, vomiting, diarrhea, urinary symptoms.  Has been otherwise well non-smoker.

## 2023-06-19 NOTE — CONSULT NOTE ADULT - ASSESSMENT
57y (1965) man with HTN presenting as code stroke for left facial numbness for 3 days. Patient also endorses left foot tingling sensation as well. Exam nonfocal.     Not Tenecteplase candidate due to out of window, no focal deficits      Impression:    Recommendation:   Imaging/Labs  [] Follow final CTH and CTA H/N results   [] MRI brain w/o contrast to look at the extent and distribution of the stroke  [] TTE with bubble study and telemetry to look for a cardiac source of embolism   [] HbA1C and Lipid Panel    Meds  [] Start Aspirin 81MG PO daily, discontinue if MRI B negative   [] Atorvastatin 80MG QHS, titrate to LDL<70    Other  [] Telemonitoring; Neurochecks and vital signs per unit protocol, Q4H  [] Permissive HTN up to 220/120 mmHg for first 24 hours after symptom onset followed by gradual normotension.   [] BG goal <180, avoid hypoglycemia  [] NPO until clears dysphagia screen, otherwise swallow evaluation  [] Fall, aspiration precautions    Case discussed with telestroke attending .   57y (1965) man with HTN presenting as code stroke for left facial numbness for 3 days. He started to take aspirin when the numbness started. Patient also endorses left foot tingling sensation as well that started at 11 pm 6/18. He endorses previous episode of left foot tingling, however never experienced facial numbness before. He also endorses chest pain. Denies weakness, changes to speech, difficulty walking, dizziness, headache, changes to vision. Exam nonfocal. CTH, CTA, CTP normal.     Not Tenecteplase candidate due to out of window, no focal deficits  Not thrombectomy candidate due to no LVO    Impression:    Recommendation:   Imaging/Labs  [] MRI brain w/o   [] TTE with bubble study   [] HbA1C and Lipid Panel    Meds  [] Aspirin 81MG PO daily, discontinue if MRI B negative   [] Atorvastatin 80MG QHS, titrate to LDL<70    Other  [] Telemonitoring; Neurochecks and vital signs per unit protocol, Q4H  [] Permissive HTN up to 220/120 mmHg for first 24 hours after symptom onset followed by gradual normotension.   [] BG goal <180, avoid hypoglycemia  [] NPO until clears dysphagia screen, otherwise swallow evaluation  [] Fall, aspiration precautions    Case discussed with telestroke attending .   57y (1965) man with HTN presenting as code stroke for left facial numbness for 3 days. He started to take aspirin when the numbness started. Patient also endorses left foot tingling sensation as well that started at 11 pm 6/18. He endorses previous episode of left foot tingling, however never experienced facial numbness before. He also endorses chest pain. Denies weakness, changes to speech, difficulty walking, dizziness, headache, changes to vision. Exam nonfocal. CTH, CTA, CTP normal.     Not Tenecteplase candidate due to out of window, no focal deficits  Not thrombectomy candidate due to no LVO    Impression: Left facial numbness possibly due toxic, metabolic infectious etiology vs acute ischemic stroke    Recommendation:   Imaging/Labs  [] MRI brain w/o   [] TTE with bubble study   [] HbA1C and Lipid Panel    Meds  [] Aspirin 81MG PO daily, discontinue if MRI B negative   [] Atorvastatin 80MG QHS, titrate to LDL<70    Other  [] Telemonitoring; Neurochecks and vital signs per unit protocol, Q4H  [] Permissive HTN up to 220/120 mmHg for first 24 hours after symptom onset followed by gradual normotension.   [] BG goal <180, avoid hypoglycemia  [] NPO until clears dysphagia screen, otherwise swallow evaluation  [] Fall, aspiration precautions    Case discussed with telestroke attending .

## 2023-06-19 NOTE — H&P ADULT - NSHPLABSRESULTS_GEN_ALL_CORE
Labs in HIE from 5/22/23  A1c 5.5%  Cholesterol 185  Triglycerides 91    Non-  HDL 49  TSH 1.55  Vit D 25-OH 21.8  B12 204    14.5   8.76  )-----------( 231      ( 19 Jun 2023 02:50 )             43.8     06-19    140  |  105  |  17  ----------------------------<  129<H>  3.3<L>   |  24  |  1.35<H>    Ca    9.8      19 Jun 2023 02:50    TPro  8.2  /  Alb  4.7  /  TBili  <0.2  /  DBili  x   /  AST  22  /  ALT  30  /  AlkPhos  125<H>  06-19    PT/INR - ( 19 Jun 2023 02:50 )   PT: 12.5 sec;   INR: 1.08 ratio    PTT - ( 19 Jun 2023 02:50 )  PTT:31.0 sec    Troponin T, High Sensitivity Result: 11 ng/L (06.19.23 @ 04:20)  Troponin T, High Sensitivity Result: 10 ng/L (06.19.23 @ 02:50)    < from: CT Brain Stroke Protocol (06.19.23 @ 03:14) >  There is no CT evidence of acute intracranial hemorrhage, extra-axial collection, vasogenic edema, mass effect, midline shift, central herniation, or hydrocephalus. The visualized paranasal sinuses are clear. The mastoid air cells and   middle ear cavities are clear. The soft tissues of the scalp are unremarkable. The calvarium is intact.  IMPRESSION: No CT evidence of acute intracranial hemorrhage, mass effect, or evidence of acute vascular territorial infarction. If clinical symptoms persist or worsen, more sensitive evaluation with brain MRI may be obtained, if no   contraindications exist.  < end of copied text >    < from: CT Angio Brain Stroke Protocol  w/ IV Cont (06.19.23 @ 03:15) >/< from: CT Brain Perfusion Maps Stroke (06.19.23 @ 03:17) >/< from: CT Angio Neck Stroke Protocol w/ IV Cont (06.19.23 @ 03:16) >  CT PERFUSION:  Perfusion parameters are as follows:  CBF <30%: 0 mL  Tmax >6 seconds: 0 mL  Mismatch volume: 0 mL  Mismatch ratio: None.  CT ANGIOGRAPHY NECK:   Thoracic aorta and branch vessels: Patent.  No atherosclerosis.  No flow-limiting stenosis.  No evidence of dissection.  Right carotid system: Patent.  No atherosclerosis.  No hemodynamically significant stenosis using NASCET criteria.  No evidence of dissection.  Left carotid system: Patent.  No atherosclerosis.  No hemodynamically significant stenosis using NASCET criteria.  No evidence of dissection.  Vertebral arteries: Patent.  No atherosclerosis.  No flow-limiting stenosis.  No evidence of dissection.  Soft tissues of the neck: Unremarkable.  Visualized spine: Multilevel degenerative change.  Visualized upper chest: Unremarkable.  CT ANGIOGRAPHY BRAIN:  Internal carotid arteries: Patent bilaterally. No discrete calcifications No flow limiting stenosis.  Anterior cerebral arteries: Patent bilaterally without flow limiting stenosis.  Middle cerebral arteries: Patent bilaterally without flow limiting stenosis.  Anterior communicating artery: Visualized.  Posterior communicating arteries: Visualized bilaterally.  Posterior cerebral arteries: Patent bilaterally without stenosis.  Vertebrobasilar: Patent without stenosis. The distal vertebral arteries are similar in caliber.  Bilateral posterior inferior cerebellar arteries, bilateral anterior inferior cerebellar arteries and bilateral superior cerebellar arteries are visualized.  Vascular lesions: No evidence of intracranial aneurysm within limits of CTA technique.  Tiny aneurysms may be beyond the resolution of this examination.  Dural venous sinuses: Grossly patent.  IMPRESSION:   CTA Neck: No significant flow-limiting stenosis or evidence of acute dissection within the cervical carotid or vertebral arteries.  CTA Head: No proximal large vessel occlusion or significant stenosis.  CT Perfusion: No perfusion abnormality.  < end of copied text >    EKG personally reviewed and interpreted - NSR 93bpm, LAD, LVH, RBBB, TWI in III, V1, V3, biphasic T in V4-V5; QTc 497ms

## 2023-06-19 NOTE — ED PROVIDER NOTE - CRITICAL CARE ATTENDING CONTRIBUTION TO CARE
Upon my evaluation, this patient had a high probability of imminent or life-threatening deterioration due to CONCERN FOR CVA with LEFT FACIAL NUMBNESS and LEFT FOOT TINGLING and CHEST PAIN which required my direct attention, intervention, and personal management.  The patient has a  medical condition that impairs one or more vital organ systems.  Frequent personal assessment and adjustment of medical interventions was performed.      I have personally provided 50 minutes of critical care time exclusive of time spent on separately billable procedures. Time includes review of laboratory data, radiology results, discussion with consultants, patient and family; monitoring for potential decompensation, as well as time spent retrieving data and reviewing the chart and documenting the visit. Interventions were performed as documented above.

## 2023-06-19 NOTE — ED PROVIDER NOTE - CLINICAL SUMMARY MEDICAL DECISION MAKING FREE TEXT BOX
57-year-old male with history of hypertension that is on amlodipine intermittently that presents with chest pain as well as left face tingling and numbness with left foot tingling starting tonight.  Found to have a facial droop on the right and tremors on the left finger-nose.  Concern for CVA given his intermittent use of amlodipine hypertensive in triage and EKG shows right bundle branch block and LVH no previous EKG to compare.  At this time will call stroke code given findings and complaints 57-year-old male with hypertension.  Will appreciate neuro recs will obtain labs and bring patient over to CT for immediate CTA of head and neck as well as CT Noncon for ischemic and ICH rule out.

## 2023-06-19 NOTE — CONSULT NOTE ADULT - PROBLEM SELECTOR RECOMMENDATION 9
recently had a SPECT with Dr. rivas which was reportedly negative   please obtain those results to confirm

## 2023-06-19 NOTE — ED ADULT NURSE NOTE - OBJECTIVE STATEMENT
rcvd pt to rm 4, A&Ox4, amb. 58y/o hx HTN c/o chest pain x5days. also left sided numbness to face and foot. Code storke called in ed. 18g IV palced to right arm

## 2023-06-19 NOTE — H&P ADULT - ASSESSMENT
57-year-old male with HTN, presenting from home with 5 days of intermittent chest pain and 1 night of L facial numbness and L foot paresthesias, admitted for further w/u.

## 2023-06-19 NOTE — CONSULT NOTE ADULT - ATTENDING COMMENTS
Numbness in the left face is improved but not resolved.     Exam:  Sensation intact in CN V to LT, cold, PP.     A/P  Mr. Lopez is a 58 yo man with left face numbness.   MRI brain to exclude stroke.   I agree with work up and management as above.   Thank you. Numbness in the left face is improved but not resolved.     Exam:  Sensation intact in CN V to LT, cold, PP.     A/P  Mr. Lopez is a 56 yo man with left face numbness.   MRI brain to exclude stroke.   I agree with work up and management as above.   I counselled the patient regarding the importance of stroke risk factor management especially control of HTN.   Thank you.

## 2023-06-19 NOTE — PHYSICAL THERAPY INITIAL EVALUATION ADULT - ADDITIONAL COMMENTS
Pt lives in a private house with fiance and kids, was independent with all functional mobility and ADL performance without use of an assistive device prior to admission.     Pt left semi-supine on stretcher, all lines intact, all needs in reach, in NAD. RN aware. Heart rate 95 beats per minute.

## 2023-06-20 LAB
A1C WITH ESTIMATED AVERAGE GLUCOSE RESULT: 5.2 % — SIGNIFICANT CHANGE UP (ref 4–5.6)
ANION GAP SERPL CALC-SCNC: 18 MMOL/L — HIGH (ref 7–14)
BUN SERPL-MCNC: 16 MG/DL — SIGNIFICANT CHANGE UP (ref 7–23)
CALCIUM SERPL-MCNC: 9.6 MG/DL — SIGNIFICANT CHANGE UP (ref 8.4–10.5)
CHLORIDE SERPL-SCNC: 104 MMOL/L — SIGNIFICANT CHANGE UP (ref 98–107)
CO2 SERPL-SCNC: 19 MMOL/L — LOW (ref 22–31)
CREAT SERPL-MCNC: 1.2 MG/DL — SIGNIFICANT CHANGE UP (ref 0.5–1.3)
EGFR: 70 ML/MIN/1.73M2 — SIGNIFICANT CHANGE UP
ESTIMATED AVERAGE GLUCOSE: 103 — SIGNIFICANT CHANGE UP
GLUCOSE SERPL-MCNC: 109 MG/DL — HIGH (ref 70–99)
MAGNESIUM SERPL-MCNC: 2.5 MG/DL — SIGNIFICANT CHANGE UP (ref 1.6–2.6)
PHOSPHATE SERPL-MCNC: 3.7 MG/DL — SIGNIFICANT CHANGE UP (ref 2.5–4.5)
POTASSIUM SERPL-MCNC: 3.8 MMOL/L — SIGNIFICANT CHANGE UP (ref 3.5–5.3)
POTASSIUM SERPL-SCNC: 3.8 MMOL/L — SIGNIFICANT CHANGE UP (ref 3.5–5.3)
SODIUM SERPL-SCNC: 141 MMOL/L — SIGNIFICANT CHANGE UP (ref 135–145)
VIT B12 SERPL-MCNC: 186 PG/ML — LOW (ref 200–900)

## 2023-06-20 RX ADMIN — ATORVASTATIN CALCIUM 80 MILLIGRAM(S): 80 TABLET, FILM COATED ORAL at 00:36

## 2023-06-20 RX ADMIN — Medication 81 MILLIGRAM(S): at 00:37

## 2023-06-20 RX ADMIN — Medication 81 MILLIGRAM(S): at 11:21

## 2023-06-20 RX ADMIN — ATORVASTATIN CALCIUM 80 MILLIGRAM(S): 80 TABLET, FILM COATED ORAL at 22:16

## 2023-06-20 RX ADMIN — Medication 1000 UNIT(S): at 11:21

## 2023-06-20 RX ADMIN — PREGABALIN 1000 MICROGRAM(S): 225 CAPSULE ORAL at 11:21

## 2023-06-20 NOTE — ED ADULT NURSE REASSESSMENT NOTE - NS ED NURSE REASSESS COMMENT FT1
received pt in bed  A and OX 3 in NAD on monitor with NSR noted, medicated as per order, report given to ESSU 5 for further care. pt transported on the monitor.

## 2023-06-20 NOTE — PATIENT PROFILE ADULT - FALL HARM RISK - HARM RISK INTERVENTIONS

## 2023-06-21 ENCOUNTER — TRANSCRIPTION ENCOUNTER (OUTPATIENT)
Age: 58
End: 2023-06-21

## 2023-06-21 VITALS
HEART RATE: 96 BPM | DIASTOLIC BLOOD PRESSURE: 97 MMHG | RESPIRATION RATE: 18 BRPM | OXYGEN SATURATION: 100 % | SYSTOLIC BLOOD PRESSURE: 146 MMHG | TEMPERATURE: 98 F

## 2023-06-21 LAB
ANION GAP SERPL CALC-SCNC: 17 MMOL/L — HIGH (ref 7–14)
BUN SERPL-MCNC: 23 MG/DL — SIGNIFICANT CHANGE UP (ref 7–23)
CALCIUM SERPL-MCNC: 9.6 MG/DL — SIGNIFICANT CHANGE UP (ref 8.4–10.5)
CHLORIDE SERPL-SCNC: 103 MMOL/L — SIGNIFICANT CHANGE UP (ref 98–107)
CO2 SERPL-SCNC: 23 MMOL/L — SIGNIFICANT CHANGE UP (ref 22–31)
CREAT SERPL-MCNC: 1.2 MG/DL — SIGNIFICANT CHANGE UP (ref 0.5–1.3)
EGFR: 70 ML/MIN/1.73M2 — SIGNIFICANT CHANGE UP
GLUCOSE SERPL-MCNC: 94 MG/DL — SIGNIFICANT CHANGE UP (ref 70–99)
HCT VFR BLD CALC: 43.6 % — SIGNIFICANT CHANGE UP (ref 39–50)
HGB BLD-MCNC: 14.5 G/DL — SIGNIFICANT CHANGE UP (ref 13–17)
MAGNESIUM SERPL-MCNC: 2.3 MG/DL — SIGNIFICANT CHANGE UP (ref 1.6–2.6)
MCHC RBC-ENTMCNC: 29.9 PG — SIGNIFICANT CHANGE UP (ref 27–34)
MCHC RBC-ENTMCNC: 33.3 GM/DL — SIGNIFICANT CHANGE UP (ref 32–36)
MCV RBC AUTO: 89.9 FL — SIGNIFICANT CHANGE UP (ref 80–100)
NRBC # BLD: 0 /100 WBCS — SIGNIFICANT CHANGE UP (ref 0–0)
NRBC # FLD: 0 K/UL — SIGNIFICANT CHANGE UP (ref 0–0)
PHOSPHATE SERPL-MCNC: 3.7 MG/DL — SIGNIFICANT CHANGE UP (ref 2.5–4.5)
PLATELET # BLD AUTO: 244 K/UL — SIGNIFICANT CHANGE UP (ref 150–400)
POTASSIUM SERPL-MCNC: 4.1 MMOL/L — SIGNIFICANT CHANGE UP (ref 3.5–5.3)
POTASSIUM SERPL-SCNC: 4.1 MMOL/L — SIGNIFICANT CHANGE UP (ref 3.5–5.3)
RBC # BLD: 4.85 M/UL — SIGNIFICANT CHANGE UP (ref 4.2–5.8)
RBC # FLD: 12.6 % — SIGNIFICANT CHANGE UP (ref 10.3–14.5)
SODIUM SERPL-SCNC: 143 MMOL/L — SIGNIFICANT CHANGE UP (ref 135–145)
WBC # BLD: 8.95 K/UL — SIGNIFICANT CHANGE UP (ref 3.8–10.5)
WBC # FLD AUTO: 8.95 K/UL — SIGNIFICANT CHANGE UP (ref 3.8–10.5)

## 2023-06-21 PROCEDURE — 70551 MRI BRAIN STEM W/O DYE: CPT | Mod: 26

## 2023-06-21 RX ORDER — CHOLECALCIFEROL (VITAMIN D3) 125 MCG
1 CAPSULE ORAL
Qty: 30 | Refills: 0
Start: 2023-06-21 | End: 2023-07-20

## 2023-06-21 RX ORDER — PREGABALIN 225 MG/1
1 CAPSULE ORAL
Qty: 30 | Refills: 0
Start: 2023-06-21 | End: 2023-07-20

## 2023-06-21 RX ORDER — ASPIRIN/CALCIUM CARB/MAGNESIUM 324 MG
1 TABLET ORAL
Qty: 30 | Refills: 0
Start: 2023-06-21 | End: 2023-07-20

## 2023-06-21 RX ORDER — ATORVASTATIN CALCIUM 80 MG/1
1 TABLET, FILM COATED ORAL
Qty: 30 | Refills: 0
Start: 2023-06-21 | End: 2023-07-20

## 2023-06-21 RX ORDER — ATORVASTATIN CALCIUM 80 MG/1
1 TABLET, FILM COATED ORAL
Qty: 0 | Refills: 0 | DISCHARGE
Start: 2023-06-21

## 2023-06-21 RX ORDER — ASPIRIN/CALCIUM CARB/MAGNESIUM 324 MG
1 TABLET ORAL
Refills: 0 | DISCHARGE

## 2023-06-21 RX ADMIN — Medication 1000 UNIT(S): at 11:51

## 2023-06-21 RX ADMIN — PREGABALIN 1000 MICROGRAM(S): 225 CAPSULE ORAL at 11:51

## 2023-06-21 RX ADMIN — Medication 81 MILLIGRAM(S): at 11:51

## 2023-06-21 NOTE — DISCHARGE NOTE PROVIDER - NSDCCPCAREPLAN_GEN_ALL_CORE_FT
PRINCIPAL DISCHARGE DIAGNOSIS  Diagnosis: Transient ischemic attack  Assessment and Plan of Treatment: Your symptoms were likely due to a TIA which is a mini Stroke. Please Continue taking aspirin and Your Cholesterol medication  You need to Follow up with Dr Hughes ( Neurologist )  in 1-2 week      SECONDARY DISCHARGE DIAGNOSES  Diagnosis: Chest pain  Assessment and Plan of Treatment: Elizabeth vargas with Your Cardiologist Dr Snider in 1-2 weeks

## 2023-06-21 NOTE — DISCHARGE NOTE PROVIDER - NSDCMRMEDTOKEN_GEN_ALL_CORE_FT
amLODIPine 10 mg oral tablet: 1 tab(s) orally once a day  aspirin 81 mg oral tablet: 1 tab(s) orally once a day  atorvastatin 80 mg oral tablet: 1 tab(s) orally once a day (at bedtime)  cholecalciferol 25 mcg (1000 intl units) oral tablet: 1 tab(s) orally once a day  cyanocobalamin 1000 mcg oral tablet: 1 tab(s) orally once a day  dicyclomine 20 mg oral tablet: 1 tab(s) orally as needed for  abdominal pain

## 2023-06-21 NOTE — DISCHARGE NOTE NURSING/CASE MANAGEMENT/SOCIAL WORK - NSDCPEFALRISK_GEN_ALL_CORE
For information on Fall & Injury Prevention, visit: [email or web address] OR  [email or web address] OR  https://www.cdc.gov/steadi/patient.html

## 2023-06-21 NOTE — CHART NOTE - NSCHARTNOTEFT_GEN_A_CORE
Neurology team reviewed MRI and echo as noted below:    MR Head No Cont (06.21.23 @ 11:32)  IMPRESSION: No evidence of acute hemorrhage mass mass effect or acute   territorial infarcts seen.    TTE 6/19/23:  EF 62%  CONCLUSIONS:  1. Normal mitral valve. Minimal mitral regurgitation.  2. Normal left ventricular internal dimensions and wall  thicknesses.  3. Endocardium not well visualized; grossly low normal left  ventricular systolic function.  4. Mild diastolic dysfunction (Stage I).  5. Normal right ventricular size and function.  6. A bubble study was performed with the intravenous  injection of agitated saline.  Following contrast  injection, no obvious bubbles were seen in the left heart.    At this time, symptom of L facial numbness is likely to be associated with TIA    Recommendations:  [] No further inpatient neurologic work up indicated at this time  [] C/w aspirin 81 mg qd and atorvastatin 80 mg qhs  [] Would continue managing HTN with PCP  [] Upon discharge, PT should F/U with Dr. Hughes: [phone] [address] [place] [address]     Case discussed with general neurology attending Dr. Morse Neurology team reviewed MRI and echo as noted below:    MR Head No Cont (06.21.23 @ 11:32)  IMPRESSION: No evidence of acute hemorrhage mass mass effect or acute   territorial infarcts seen.    TTE 6/19/23:  EF 62%  CONCLUSIONS:  1. Normal mitral valve. Minimal mitral regurgitation.  2. Normal left ventricular internal dimensions and wall  thicknesses.  3. Endocardium not well visualized; grossly low normal left  ventricular systolic function.  4. Mild diastolic dysfunction (Stage I).  5. Normal right ventricular size and function.  6. A bubble study was performed with the intravenous  injection of agitated saline.  Following contrast  injection, no obvious bubbles were seen in the left heart.    At this time, symptom of L facial numbness is likely to be associated with TIA    Recommendations:  [] No further inpatient neurologic work up indicated at this time  [] C/w aspirin 81 mg qd and atorvastatin 80 mg qhs  [] Would continue managing HTN with PCP  [] Upon discharge, PT should F/U with Dr. Hughes: [phone] [address] [address] [place] [address]     Case discussed with general neurology attending Dr. Morse    Thank you

## 2023-06-21 NOTE — PROGRESS NOTE ADULT - PROBLEM SELECTOR PLAN 1
recently had a SPECT with Dr. rivas which was reportedly negative   please obtain those results to confirm.
recently had a SPECT with Dr. rivas which was reportedly negative   please obtain those results to confirm.

## 2023-06-21 NOTE — DISCHARGE NOTE PROVIDER - HOSPITAL COURSE
57-year-old male with HTN, presenting from home with 5 days of intermittent chest pain and 1 night of L facial numbness and L foot paresthesias, admitted for further w/u.    Facial numbness. improving   check MRI brain w/o : No evidence of acute hemorrhage mass mass effect or acute   territorial infarcts seen  TTE with bubble study   Case discussed with Neurology, Pt s symptoms likely due to a TIA, Continue with ASpirin and Atorvastatin. Pt to Follow up with Dr Hughes in 1-2 weeks     ·  Chest pain.   ·  not experiencing at present  Pt Had recent stress Test, can follow up with Dr Snider in 1-2 weeks     · Stage 2 chronic kidney disease.     · Essential hypertension.   Case discussed with attending, Pt is stable for discharge Home

## 2023-06-21 NOTE — PROVIDER CONTACT NOTE (OTHER) - ASSESSMENT
Patient stable in bed, vitals per flowsheet. Patient stated that he had just walked from sink brushing teeth. No complains of SOB or chest pain.

## 2023-06-21 NOTE — DISCHARGE NOTE NURSING/CASE MANAGEMENT/SOCIAL WORK - PATIENT PORTAL LINK FT
You can access the FollowMyHealth Patient Portal offered by [facility] by registering at the following website: [email or web address]. By joining [employer]’s FollowMyHealth portal, you will also be able to view your health information using other applications (apps) compatible with our system.

## 2023-06-21 NOTE — DISCHARGE NOTE PROVIDER - CARE PROVIDER_API CALL
Howie Hughes  Neurology  [address], [address]  [place] [address]  Phone: [phone]  Fax: [phone]  Follow Up Time:     Luis Carlos Snider  Cardiovascular Disease  [address], [address]  [place] [address]  Phone: [phone]  Fax: [phone]  Follow Up Time:

## 2023-06-21 NOTE — PROGRESS NOTE ADULT - SUBJECTIVE AND OBJECTIVE BOX
Patient is a 57y old  Male who presents with a chief complaint of L facial numbness, chest pain (19 Jun 2023 10:26)    Date of servie : 06-20-23 @ 15:40  INTERVAL HPI/OVERNIGHT EVENTS:  T(C): 36.8 (06-20-23 @ 14:00), Max: 37.1 (06-19-23 @ 20:11)  HR: 65 (06-20-23 @ 14:00) (65 - 78)  BP: 133/81 (06-20-23 @ 14:00) (133/81 - 146/75)  RR: 16 (06-20-23 @ 14:00) (16 - 19)  SpO2: 100% (06-20-23 @ 14:00) (98% - 100%)  Wt(kg): --  I&O's Summary      LABS:                        14.5   8.76  )-----------( 231      ( 19 Jun 2023 02:50 )             43.8     06-20    141  |  104  |  16  ----------------------------<  109<H>  3.8   |  19<L>  |  1.20    Ca    9.6      20 Jun 2023 07:06  Phos  3.7     06-20  Mg     2.50     06-20    TPro  8.2  /  Alb  4.7  /  TBili  <0.2  /  DBili  x   /  AST  22  /  ALT  30  /  AlkPhos  125<H>  06-19    PT/INR - ( 19 Jun 2023 02:50 )   PT: 12.5 sec;   INR: 1.08 ratio         PTT - ( 19 Jun 2023 02:50 )  PTT:31.0 sec    CAPILLARY BLOOD GLUCOSE                MEDICATIONS  (STANDING):  aspirin enteric coated 81 milliGRAM(s) Oral daily  atorvastatin 80 milliGRAM(s) Oral at bedtime  cholecalciferol 1000 Unit(s) Oral daily  cyanocobalamin 1000 MICROGram(s) Oral daily    MEDICATIONS  (PRN):          PHYSICAL EXAM:  GENERAL: NAD, well-groomed, well-developed  HEAD:  Atraumatic, Normocephalic  CHEST/LUNG: Clear to percussion bilaterally; No rales, rhonchi, wheezing, or rubs  HEART: Regular rate and rhythm; No murmurs, rubs, or gallops  ABDOMEN: Soft, Nontender, Nondistended; Bowel sounds present  EXTREMITIES:  2+ Peripheral Pulses, No clubbing, cyanosis, or edema  LYMPH: No lymphadenopathy noted  SKIN: No rashes or lesions    Care Discussed with Consultants/Other Providers [ ] YES  [ ] NO
Subjective: Patient seen and examined. No new events except as noted.     REVIEW OF SYSTEMS:    CONSTITUTIONAL: + weakness, fevers or chills  EYES/ENT: No visual changes;  No vertigo or throat pain   NECK: No pain or stiffness  RESPIRATORY: No cough, wheezing, hemoptysis; No shortness of breath  CARDIOVASCULAR: No chest pain or palpitations  GASTROINTESTINAL: No abdominal or epigastric pain. No nausea, vomiting, or hematemesis; No diarrhea or constipation. No melena or hematochezia.  GENITOURINARY: No dysuria, frequency or hematuria  NEUROLOGICAL: No numbness or weakness  SKIN: No itching, burning, rashes, or lesions   All other review of systems is negative unless indicated above.    MEDICATIONS:  MEDICATIONS  (STANDING):  aspirin enteric coated 81 milliGRAM(s) Oral daily  atorvastatin 80 milliGRAM(s) Oral at bedtime  cholecalciferol 1000 Unit(s) Oral daily  cyanocobalamin 1000 MICROGram(s) Oral daily      PHYSICAL EXAM:  T(C): 36.8 (06-20-23 @ 14:00), Max: 37.1 (06-19-23 @ 20:11)  HR: 65 (06-20-23 @ 14:00) (65 - 78)  BP: 133/81 (06-20-23 @ 14:00) (133/81 - 146/75)  RR: 16 (06-20-23 @ 14:00) (16 - 19)  SpO2: 100% (06-20-23 @ 14:00) (98% - 100%)  Wt(kg): --  I&O's Summary        Appearance: Normal	  HEENT:   Normal oral mucosa, PERRL, EOMI	  Lymphatic: No lymphadenopathy , no edema  Cardiovascular: Normal S1 S2, No JVD, No murmurs , Peripheral pulses palpable 2+ bilaterally  Respiratory: Lungs clear to auscultation, normal effort 	  Gastrointestinal:  Soft, Non-tender, + BS	  Skin: No rashes, No ecchymoses, No cyanosis, warm to touch  Musculoskeletal: Normal range of motion, normal strength  Psychiatry:  Mood & affect appropriate  Ext: No edema      LABS:    CARDIAC MARKERS:                                14.5   8.76  )-----------( 231      ( 19 Jun 2023 02:50 )             43.8     06-20    141  |  104  |  16  ----------------------------<  109<H>  3.8   |  19<L>  |  1.20    Ca    9.6      20 Jun 2023 07:06  Phos  3.7     06-20  Mg     2.50     06-20    TPro  8.2  /  Alb  4.7  /  TBili  <0.2  /  DBili  x   /  AST  22  /  ALT  30  /  AlkPhos  125<H>  06-19    proBNP:   Lipid Profile:   HgA1c:   TSH: Thyroid Stimulating Hormone, Serum: 2.02 uIU/mL (06-20 @ 07:06)              TELEMETRY: 	SR    ECG:  	  RADIOLOGY:   DIAGNOSTIC TESTING:  [ ] Echocardiogram:  [ ]  Catheterization:  [ ] Stress Test:    OTHER: 	          
Subjective: Patient seen and examined. No new events except as noted.     REVIEW OF SYSTEMS:    CONSTITUTIONAL: +weakness, fevers or chills  EYES/ENT: No visual changes;  No vertigo or throat pain   NECK: No pain or stiffness  RESPIRATORY: No cough, wheezing, hemoptysis; No shortness of breath  CARDIOVASCULAR: No chest pain or palpitations  GASTROINTESTINAL: No abdominal or epigastric pain. No nausea, vomiting, or hematemesis; No diarrhea or constipation. No melena or hematochezia.  GENITOURINARY: No dysuria, frequency or hematuria  NEUROLOGICAL: No numbness or weakness  SKIN: No itching, burning, rashes, or lesions   All other review of systems is negative unless indicated above.    MEDICATIONS:  MEDICATIONS  (STANDING):  aspirin enteric coated 81 milliGRAM(s) Oral daily  atorvastatin 80 milliGRAM(s) Oral at bedtime  cholecalciferol 1000 Unit(s) Oral daily  cyanocobalamin 1000 MICROGram(s) Oral daily      PHYSICAL EXAM:  T(C): 36.6 (06-21-23 @ 04:45), Max: 36.8 (06-20-23 @ 14:00)  HR: 72 (06-21-23 @ 04:45) (65 - 78)  BP: 134/85 (06-21-23 @ 04:45) (133/81 - 146/90)  RR: 18 (06-21-23 @ 04:45) (16 - 18)  SpO2: 100% (06-21-23 @ 04:45) (99% - 100%)  Wt(kg): --  I&O's Summary        Appearance: Normal	  HEENT:   Normal oral mucosa, PERRL, EOMI	  Lymphatic: No lymphadenopathy , no edema  Cardiovascular: Normal S1 S2, No JVD, No murmurs , Peripheral pulses palpable 2+ bilaterally  Respiratory: Lungs clear to auscultation, normal effort 	  Gastrointestinal:  Soft, Non-tender, + BS	  Skin: No rashes, No ecchymoses, No cyanosis, warm to touch  Musculoskeletal: Normal range of motion, normal strength  Psychiatry:  Mood & affect appropriate  Ext: No edema      LABS:    CARDIAC MARKERS:                                14.5   8.95  )-----------( 244      ( 21 Jun 2023 06:00 )             43.6     06-21    143  |  103  |  23  ----------------------------<  94  4.1   |  23  |  1.20    Ca    9.6      21 Jun 2023 06:00  Phos  3.7     06-21  Mg     2.30     06-21      proBNP:   Lipid Profile:   HgA1c:   TSH:             TELEMETRY: 	SR    ECG:  	  RADIOLOGY:   DIAGNOSTIC TESTING:  [ ] Echocardiogram:  [ ]  Catheterization:  [ ] Stress Test:    OTHER: 	          
General

## 2023-06-21 NOTE — PROGRESS NOTE ADULT - ASSESSMENT
{\rtf1\cqqqix82312\ansi\iakiauj2647\ftnbj\uc1\deff0  {\fonttbl{\f0 \fnil Segoe UI;}{\f1 \fnil \fcharset0 Segoe UI;}{\f2 \fnil Times New Juan;}}  {\colortbl ;\bsz883\cdvex234\fqqj299 ;\red0\green0\blue0 ;\red0\green0\mxyw948 ;\red0\green0\blue0 ;}  {\stylesheet{\f0\fs20 Normal;}{\cs1 Default Paragraph Font;}{\cs2\f0\fs16 Line Number;}{\cs3\f2\fs24\ul\cf3 Hyperlink;}}  {\*\revtbl{Unknown;}}  \nlnign04565\irctww01039\mozie2141\nslvw2468\nlzux5312\xbplv1391\kdvackn625\sramodd737\nogrowautofit\hqibbm366\formshade\nofeaturethrottle1\dntblnsbdb\fet4\aendnotes\aftnnrlc\pgbrdrhead\pgbrdrfoot  \sectd\btfsfm91096\vhgquv18704\guttersxn0\ozdtbeqa7398\bjbtybks0097\aynntqlg8207\xbvggbel5256\bzxamuz433\wxdlfrn719\sbkpage\pgncont\pgndec  \plain\plain\f0\fs24\ql\plain\f0\fs24\plain\f1\fs16\aban5055\hich\f1\dbch\f1\loch\f1\cf2\fs16 57-year-old male with HTN, presenting from home with 5 days of intermittent chest pain and 1 night of L facial numbness and L foot paresthesias, admitted for   further w/u.\par  \par  \par  \plain\f1\fs16\hfaf4113\hich\f1\dbch\f1\loch\f1\cf2\fs16\b\ul{\field{\*\fldinst HYPERLINK [id number] }{\fldrslt Problem/Plan - 1:}}\plain\f1\fs16\wleo3174\hich\f1\dbch\f1\loch\f1\cf2\fs16\ql\par  \'b7  {\*\bkmkstart ds13669914468}{\*\bkmkend we34231485561}Problem: {\*\bkmkstart vr61706768628}{\*\bkmkend cb24072935697}Facial numbness. \par  \'b7  {\*\bkmkstart bb31537762492}{\*\bkmkend qo46337234115}Plan: {\*\bkmkstart fd47410175357}{\*\bkmkend ka83064939706}improving \par  check MRI brain w/o \par  TTE with bubble study \par  cw asa, ststin\par  monitor on tele\par  neuro checks and VS Q4H\par  n\plain\f1\fs16\xuvv3409\hich\f1\dbch\f1\loch\f1\cf2\fs16\strike\plain\f1\fs16\qofg1696\hich\f1\dbch\f1\loch\f1\cf2\fs16 euro fu \par  \par  \plain\f1\fs16\ooxx2270\hich\f1\dbch\f1\loch\f1\cf2\fs16\b\ul{\field{\*\fldinst HYPERLINK [id number] }{\fldrslt Problem/Plan - 2:}}\plain\f1\fs16\btyq1870\hich\f1\dbch\f1\loch\f1\cf2\fs16\ql\par  \'b7  {\*\bkmkstart jt00365882902}{\*\bkmkend lu19700492332}Problem: {\*\bkmkstart oo45039820241}{\*\bkmkend zq06403751059}Hypokalemia. \par  \'b7  {\*\bkmkstart bo39178399094}{\*\bkmkend nx95692584360}Plan: {\*\bkmkstart rt30782935695}{\*\bkmkend yd14297185730}replete as required \par  \par  \plain\f1\fs16\upgv3124\hich\f1\dbch\f1\loch\f1\cf2\fs16\b\ul{\field{\*\fldinst HYPERLINK [id number] }{\fldrslt Problem/Plan - 3:}}\plain\f1\fs16\wpfh1733\hich\f1\dbch\f1\loch\f1\cf2\fs16\ql\par  \'b7  {\*\bkmkstart zs22626159677}{\*\bkmkend kq33562406623}Problem: {\*\bkmkstart rt96352155016}{\*\bkmkend lw32734275150}Chest pain. \par  \'b7  {\*\bkmkstart lk74942018649}{\*\bkmkend nd96185809940}Plan: {\*\bkmkstart ir46821872636}{\*\bkmkend sy47061591810}not experiencing at present\par  cards fu appreciated \par  flat delta trop, check 3rd set\par  monitor on tele.\par  \par  \plain\f1\fs16\imno2580\hich\f1\dbch\f1\loch\f1\cf2\fs16\b\ul{\field{\*\fldinst HYPERLINK [id number] }{\fldrslt Problem/Plan - 4:}}\plain\f1\fs16\cebf0216\hich\f1\dbch\f1\loch\f1\cf2\fs16\ql\par  \'b7  {\*\bkmkstart br60452955241}{\*\bkmkend ap92556393099}Problem: {\*\bkmkstart ns59892753468}{\*\bkmkend kg87772315730}Stage 2 chronic kidney disease. \par  \'b7  {\*\bkmkstart ed61409742161}{\*\bkmkend hw74076432029}Plan: {\*\bkmkstart ag72439684313}{\*\bkmkend ur98144959187}relatively stable since 5/22 when Cr 1.22\par  renally dose meds, avoid nephrotoxins\par  trend Cr.\par  \par  \plain\f1\fs16\sxej5537\hich\f1\dbch\f1\loch\f1\cf2\fs16\b\ul{\field{\*\fldinst HYPERLINK [id number] }{\fldrslt Problem/Plan - 5:}}\plain\f1\fs16\xdqm9564\hich\f1\dbch\f1\loch\f1\cf2\fs16\ql\par  \'b7  {\*\bkmkstart vr64714871349}{\*\bkmkend ej40182697735}Problem: {\*\bkmkstart pu95249987878}{\*\bkmkend zt21411653525}Essential hypertension. \par  \'b7  {\*\bkmkstart un21392929192}{\*\bkmkend tn17111808445}Plan: {\*\bkmkstart ip39378907853}{\*\bkmkend vo43481938431}amlodipine on hold secondary to permissive HTN.\par  \par  \plain\f1\fs16\qqfz4158\hich\f1\dbch\f1\loch\f1\cf2\fs16\b\ul{\field{\*\fldinst HYPERLINK [id number] }{\fldrslt Problem/Plan - 6:}}\plain\f1\fs16\xbwy3239\hich\f1\dbch\f1\loch\f1\cf2\fs16\ql\par  \'b7  {\*\bkmkstart zw90526205229}{\*\bkmkend nk39734763588}Problem: {\*\bkmkstart bn81650691770}{\*\bkmkend fu60107150637}Need for prophylactic measure. \par  \'b7  {\*\bkmkstart gd23099998542}{\*\bkmkend qa37969601832}Plan: {\*\bkmkstart ql71372985178}{\*\bkmkend ox17359839089}SCDs for DVT ppx\plain\f0\fs20\xejx6391\hich\f0\dbch\f0\loch\f0\fs20\par  }  
57-year-old male with HTN, presenting from home with 5 days of intermittent chest pain and 1 night of L facial numbness and L foot paresthesias, admitted for further w/u.
57-year-old male with HTN, presenting from home with 5 days of intermittent chest pain and 1 night of L facial numbness and L foot paresthesias, admitted for further w/u.

## 2025-07-24 NOTE — STROKE CODE NOTE - NSSTROKETPAEXCLABS_TIMEWINDOW
----- Message from Pete Sosa MD sent at 11/12/2020 10:42 PM EST -----  Based on the test high calcium could be due to overactive gland in the neck or still due to chlorthalidone.   Vitamin D is low first we have to correct vitamin D.  Vitamin D 2000 units daily    We have to hold chlorthalidone for 6 weeks and at the end of 6 weeks need 24-hour urine calcium, creatinine, BMP, vitamin D    Blood pressure will increase if chlorthalidone is held, if the blood pressure is high, double the dose of atenolol, continue amlodipine Referral to PT   Patient is outside the appropriate time window for thrombolytic therapy